# Patient Record
Sex: MALE | NOT HISPANIC OR LATINO | Employment: FULL TIME | URBAN - METROPOLITAN AREA
[De-identification: names, ages, dates, MRNs, and addresses within clinical notes are randomized per-mention and may not be internally consistent; named-entity substitution may affect disease eponyms.]

---

## 2021-05-07 ENCOUNTER — HOSPITAL ENCOUNTER (INPATIENT)
Facility: CLINIC | Age: 36
LOS: 4 days | Discharge: HOME | DRG: 177 | End: 2021-05-11
Attending: EMERGENCY MEDICINE | Admitting: INTERNAL MEDICINE
Payer: COMMERCIAL

## 2021-05-07 ENCOUNTER — APPOINTMENT (OUTPATIENT)
Dept: GENERAL RADIOLOGY | Facility: CLINIC | Age: 36
DRG: 177 | End: 2021-05-07
Attending: EMERGENCY MEDICINE
Payer: COMMERCIAL

## 2021-05-07 DIAGNOSIS — U07.1 2019 NOVEL CORONAVIRUS DISEASE (COVID-19): ICD-10-CM

## 2021-05-07 DIAGNOSIS — J96.01 ACUTE RESPIRATORY FAILURE WITH HYPOXIA (H): ICD-10-CM

## 2021-05-07 DIAGNOSIS — I10 ESSENTIAL HYPERTENSION: Primary | ICD-10-CM

## 2021-05-07 LAB
ABO + RH BLD: NORMAL
ABO + RH BLD: NORMAL
ALBUMIN SERPL-MCNC: 3.3 G/DL (ref 3.4–5)
ALP SERPL-CCNC: 50 U/L (ref 40–150)
ALT SERPL W P-5'-P-CCNC: 34 U/L (ref 0–70)
ANION GAP SERPL CALCULATED.3IONS-SCNC: 6 MMOL/L (ref 3–14)
AST SERPL W P-5'-P-CCNC: 31 U/L (ref 0–45)
BASOPHILS # BLD AUTO: 0 10E9/L (ref 0–0.2)
BASOPHILS NFR BLD AUTO: 0.1 %
BILIRUB SERPL-MCNC: 0.5 MG/DL (ref 0.2–1.3)
BUN SERPL-MCNC: 11 MG/DL (ref 7–30)
CALCIUM SERPL-MCNC: 8.4 MG/DL (ref 8.5–10.1)
CHLORIDE SERPL-SCNC: 103 MMOL/L (ref 94–109)
CO2 SERPL-SCNC: 25 MMOL/L (ref 20–32)
CREAT SERPL-MCNC: 1.12 MG/DL (ref 0.66–1.25)
CRP SERPL-MCNC: 154 MG/L (ref 0–8)
D DIMER PPP FEU-MCNC: 0.6 UG/ML FEU (ref 0–0.5)
DIFFERENTIAL METHOD BLD: NORMAL
EOSINOPHIL # BLD AUTO: 0 10E9/L (ref 0–0.7)
EOSINOPHIL NFR BLD AUTO: 0 %
ERYTHROCYTE [DISTWIDTH] IN BLOOD BY AUTOMATED COUNT: 13.2 % (ref 10–15)
GFR SERPL CREATININE-BSD FRML MDRD: 85 ML/MIN/{1.73_M2}
GLUCOSE SERPL-MCNC: 83 MG/DL (ref 70–99)
HCT VFR BLD AUTO: 46.9 % (ref 40–53)
HGB BLD-MCNC: 15.1 G/DL (ref 13.3–17.7)
IMM GRANULOCYTES # BLD: 0 10E9/L (ref 0–0.4)
IMM GRANULOCYTES NFR BLD: 0.4 %
LDH SERPL L TO P-CCNC: 403 U/L (ref 85–227)
LYMPHOCYTES # BLD AUTO: 1.3 10E9/L (ref 0.8–5.3)
LYMPHOCYTES NFR BLD AUTO: 15.4 %
MCH RBC QN AUTO: 28.7 PG (ref 26.5–33)
MCHC RBC AUTO-ENTMCNC: 32.2 G/DL (ref 31.5–36.5)
MCV RBC AUTO: 89 FL (ref 78–100)
MONOCYTES # BLD AUTO: 0.3 10E9/L (ref 0–1.3)
MONOCYTES NFR BLD AUTO: 3.1 %
NEUTROPHILS # BLD AUTO: 6.8 10E9/L (ref 1.6–8.3)
NEUTROPHILS NFR BLD AUTO: 81 %
NRBC # BLD AUTO: 0 10*3/UL
NRBC BLD AUTO-RTO: 0 /100
PLATELET # BLD AUTO: 175 10E9/L (ref 150–450)
POTASSIUM SERPL-SCNC: 3.7 MMOL/L (ref 3.4–5.3)
PROT SERPL-MCNC: 8.2 G/DL (ref 6.8–8.8)
RBC # BLD AUTO: 5.27 10E12/L (ref 4.4–5.9)
RETICS # AUTO: 16.5 10E9/L (ref 25–95)
RETICS/RBC NFR AUTO: 0.3 % (ref 0.5–2)
SODIUM SERPL-SCNC: 134 MMOL/L (ref 133–144)
SPECIMEN EXP DATE BLD: NORMAL
TROPONIN I SERPL-MCNC: <0.015 UG/L (ref 0–0.04)
WBC # BLD AUTO: 8.4 10E9/L (ref 4–11)

## 2021-05-07 PROCEDURE — 250N000013 HC RX MED GY IP 250 OP 250 PS 637: Performed by: INTERNAL MEDICINE

## 2021-05-07 PROCEDURE — 99223 1ST HOSP IP/OBS HIGH 75: CPT | Mod: AI | Performed by: INTERNAL MEDICINE

## 2021-05-07 PROCEDURE — 93005 ELECTROCARDIOGRAM TRACING: CPT

## 2021-05-07 PROCEDURE — 86901 BLOOD TYPING SEROLOGIC RH(D): CPT | Performed by: PHYSICIAN ASSISTANT

## 2021-05-07 PROCEDURE — 258N000003 HC RX IP 258 OP 636: Performed by: PHYSICIAN ASSISTANT

## 2021-05-07 PROCEDURE — 84484 ASSAY OF TROPONIN QUANT: CPT | Performed by: EMERGENCY MEDICINE

## 2021-05-07 PROCEDURE — 85379 FIBRIN DEGRADATION QUANT: CPT | Performed by: EMERGENCY MEDICINE

## 2021-05-07 PROCEDURE — 71045 X-RAY EXAM CHEST 1 VIEW: CPT

## 2021-05-07 PROCEDURE — 99285 EMERGENCY DEPT VISIT HI MDM: CPT | Mod: 25

## 2021-05-07 PROCEDURE — 250N000009 HC RX 250: Performed by: PHYSICIAN ASSISTANT

## 2021-05-07 PROCEDURE — 85025 COMPLETE CBC W/AUTO DIFF WBC: CPT | Performed by: EMERGENCY MEDICINE

## 2021-05-07 PROCEDURE — 80053 COMPREHEN METABOLIC PANEL: CPT | Performed by: EMERGENCY MEDICINE

## 2021-05-07 PROCEDURE — 36415 COLL VENOUS BLD VENIPUNCTURE: CPT | Performed by: PHYSICIAN ASSISTANT

## 2021-05-07 PROCEDURE — 83615 LACTATE (LD) (LDH) ENZYME: CPT | Performed by: PHYSICIAN ASSISTANT

## 2021-05-07 PROCEDURE — 250N000013 HC RX MED GY IP 250 OP 250 PS 637: Performed by: EMERGENCY MEDICINE

## 2021-05-07 PROCEDURE — 86140 C-REACTIVE PROTEIN: CPT | Performed by: PHYSICIAN ASSISTANT

## 2021-05-07 PROCEDURE — 250N000011 HC RX IP 250 OP 636: Performed by: PHYSICIAN ASSISTANT

## 2021-05-07 PROCEDURE — 120N000001 HC R&B MED SURG/OB

## 2021-05-07 PROCEDURE — 99207 PR NO BILLABLE SERVICE THIS VISIT: CPT | Performed by: PHYSICIAN ASSISTANT

## 2021-05-07 PROCEDURE — 96374 THER/PROPH/DIAG INJ IV PUSH: CPT

## 2021-05-07 PROCEDURE — 86900 BLOOD TYPING SEROLOGIC ABO: CPT | Performed by: PHYSICIAN ASSISTANT

## 2021-05-07 PROCEDURE — 85045 AUTOMATED RETICULOCYTE COUNT: CPT | Performed by: PHYSICIAN ASSISTANT

## 2021-05-07 PROCEDURE — XW033E5 INTRODUCTION OF REMDESIVIR ANTI-INFECTIVE INTO PERIPHERAL VEIN, PERCUTANEOUS APPROACH, NEW TECHNOLOGY GROUP 5: ICD-10-PCS | Performed by: INTERNAL MEDICINE

## 2021-05-07 PROCEDURE — 250N000011 HC RX IP 250 OP 636: Performed by: EMERGENCY MEDICINE

## 2021-05-07 RX ORDER — DEXAMETHASONE SODIUM PHOSPHATE 10 MG/ML
6 INJECTION, SOLUTION INTRAMUSCULAR; INTRAVENOUS ONCE
Status: COMPLETED | OUTPATIENT
Start: 2021-05-07 | End: 2021-05-07

## 2021-05-07 RX ORDER — ACETAMINOPHEN 325 MG/1
650 TABLET ORAL EVERY 4 HOURS PRN
Status: DISCONTINUED | OUTPATIENT
Start: 2021-05-07 | End: 2021-05-11 | Stop reason: HOSPADM

## 2021-05-07 RX ORDER — FAMOTIDINE 10 MG
10 TABLET ORAL 2 TIMES DAILY
Status: DISCONTINUED | OUTPATIENT
Start: 2021-05-07 | End: 2021-05-11 | Stop reason: HOSPADM

## 2021-05-07 RX ORDER — HYDRALAZINE HYDROCHLORIDE 10 MG/1
10 TABLET, FILM COATED ORAL EVERY 8 HOURS PRN
Status: DISCONTINUED | OUTPATIENT
Start: 2021-05-07 | End: 2021-05-11 | Stop reason: HOSPADM

## 2021-05-07 RX ORDER — BENZONATATE 100 MG/1
100 CAPSULE ORAL 3 TIMES DAILY PRN
Status: DISCONTINUED | OUTPATIENT
Start: 2021-05-07 | End: 2021-05-11 | Stop reason: HOSPADM

## 2021-05-07 RX ORDER — ACETAMINOPHEN 500 MG
1000 TABLET ORAL EVERY 8 HOURS PRN
COMMUNITY

## 2021-05-07 RX ORDER — LIDOCAINE 40 MG/G
CREAM TOPICAL
Status: DISCONTINUED | OUTPATIENT
Start: 2021-05-07 | End: 2021-05-11 | Stop reason: HOSPADM

## 2021-05-07 RX ORDER — ACETAMINOPHEN 500 MG
1000 TABLET ORAL ONCE
Status: COMPLETED | OUTPATIENT
Start: 2021-05-07 | End: 2021-05-07

## 2021-05-07 RX ORDER — ALBUTEROL SULFATE 90 UG/1
2 AEROSOL, METERED RESPIRATORY (INHALATION) EVERY 4 HOURS PRN
Status: DISCONTINUED | OUTPATIENT
Start: 2021-05-07 | End: 2021-05-11 | Stop reason: HOSPADM

## 2021-05-07 RX ORDER — ACETAMINOPHEN 650 MG/1
650 SUPPOSITORY RECTAL EVERY 4 HOURS PRN
Status: DISCONTINUED | OUTPATIENT
Start: 2021-05-07 | End: 2021-05-11 | Stop reason: HOSPADM

## 2021-05-07 RX ADMIN — ENOXAPARIN SODIUM 40 MG: 40 INJECTION SUBCUTANEOUS at 20:39

## 2021-05-07 RX ADMIN — Medication 250 MG: at 20:39

## 2021-05-07 RX ADMIN — DEXAMETHASONE SODIUM PHOSPHATE 6 MG: 10 INJECTION, SOLUTION INTRAMUSCULAR; INTRAVENOUS at 17:05

## 2021-05-07 RX ADMIN — SODIUM CHLORIDE 50 ML: 9 INJECTION, SOLUTION INTRAVENOUS at 18:51

## 2021-05-07 RX ADMIN — REMDESIVIR 200 MG: 100 INJECTION, POWDER, LYOPHILIZED, FOR SOLUTION INTRAVENOUS at 18:51

## 2021-05-07 RX ADMIN — FAMOTIDINE 10 MG: 10 TABLET, FILM COATED ORAL at 20:39

## 2021-05-07 RX ADMIN — ACETAMINOPHEN 1000 MG: 500 TABLET, FILM COATED ORAL at 15:43

## 2021-05-07 ASSESSMENT — ENCOUNTER SYMPTOMS
SHORTNESS OF BREATH: 1
FEVER: 1

## 2021-05-07 ASSESSMENT — ACTIVITIES OF DAILY LIVING (ADL): ADLS_ACUITY_SCORE: 16

## 2021-05-07 NOTE — H&P
[facility] Hospitalist Admission Note  Name: Shay Sanabria    MRN: [id number]  YOB: 1985    Age: 35 year old  Date of admission: 5/7/2021  Primary care provider: No Ref-Primary, Physician      Date of Admission:  5/7/2021    Assessment & Plan   Shay Sanabria is a 35 year old male with PMhx of obesity, who presented with dyspnea in the setting of known COVID 19 viral infection after being referred to [facility] ED from outside urgent care.     On presentation temperature 102.5F, /97, pulse 121, RR 29, Oxygen saturations 96% on 2 LPM supplemental NC. 88% with activity on room air.   Laboratory studies including CBC and CMP, remarkable for a creatinine of 1.12.  D-dimer 0.6.  Troponin undetectable.    Discussed with Dr. Siegel in the ED. Patient received 100 mg APAP, Decadron 6 mg IV. Admission requested from hospitalist service for further cares and monitoring.  Per ED provider patient refused transfer to [facility] for consolidated care in the setting of known COVID 19 infection.     1. Acute Hypoxemic Respiratory Failure   COVID 19 Viral  Pneumonia   Diagnosed initially 4/28/21, symptoms of fatigue developed 5/4/2021. Now with dyspnea, fever.  Chest imaging with initial- X-ray-  bilateral airspace opacities in the mid and lower lungs. No additional imaging done. Oxygen needs have been stable on nasal cannula at 1-2 L/min. Desaturated to 88% with ambulation in ED.    PLAN:  - continue care on medical floor with continuous pulse oximetry under contact and droplet precautions, minimized lab draws, and care consolidation  - Oxygen support with nasal cannula at as needed, titrate to keep SPO2 > 90% and no higher than 96%  - d dimer 0.6, reasonable to hold of on further chest imaging such as CT PE study. If ascending o2 requirements or clinically decompensating recommend pursuing. For continue DVT ppx with Lovenox.   - Avoid nebulizers in favor of MDIs; no inhalers needed.  - Decadron daily (day  1 on 5/7)  - monitor glucose with AM lab work. May need sliding scale coverage  - GI ppx  - Consider Remdesivir EUA if elgible (day 1 on 5/7)  - IV fluids are not indicated at this time, hold off if intake/output/ BP remain appropriate   - Antibiotics are are not indicated.   - analgesics prn     2. Atypical Chest Pain  Had self limited, non exertional episode of CP on 5/6. Self resolved within <1 minute.  No associated dyspnea, diaphoresis. No angina on admission.   EKG non ischemic.  Troponin undetectable.  Do not suspect that this is related to cardiac ischemic chest pain given history reassuring work-up and vitals thus far.  Would not trend further troponins at this time in the absence of recurrent symptoms as this is greater than 24 hours since this episode.  He could consider an outpatient stress evaluation.    -Monitor on cardiac telemetry.  -Notify provider for redevelopment of chest pain    3. Hypertension   Elevated BP's on presentation 170-180's systolic.   Pt reports home systolic pressure readings around 140. He has been monitoring given history of elevated blood pressures. Not currently on oral anti - hypertensive's as per report improving with intentional weight loss.  -monitor BP  -may start prn oral hydralazine here in hospital  -recommend he follow up with PCP regarding this    DVT Prophylaxis: lovenox  Code Status: Full Code  Expected discharge: 2 - 3 days, recommended to prior living arrangement .    Daria Perez PA-C    Primary Care Physician   Physician No Ref-Primary    Chief Complaint   Shortness of breath, chest pain    History is obtained from the patient      History of Present Illness   Shay Sanabria is a 35 year old male with PMHx of obesity, who presented with shortness of breath.   Mr. Sanabria was initially diagnosed with Covid on April 28, 2021 after he was screening tested when wife became ill and all family tested positive.  He subsequently developed symptoms around 5/3/2021  that started with fatigue, had self limited episode of loose stool.     On 5/7/2021 he presented to urgent care after complaining of difficulty breathing to nurse triage line.  He was transferred to [facility] ED from outside urgent care for further evaluation.   Dyspnea was described as shortness of breath with activity. He has had associated fatigue, palpitations.  He had a self-limited episode of chest pain lasting under 1 minute that was described as a dull discomfort.  This came on while he was resting. It had no radiation.   He denied any lower extremity swelling or pain.  Had been using tylenol to treat symptoms at home.     On interview patient reports that his dyspnea has improved with supplemental oxygen.  He denies any presyncopal or lightheadedness symptoms with ambulation.  No cough or fever at home. He has not seen a primary care doctor for about 5 years.  He denies any cardiac disease.      Past Medical History    Elevated blood pressure  Obesity    Past Surgical History   None.    Prior to Admission Medications   None     Allergies   No Known Allergies    Social History   Mr Sanabria lives with his family. He works full time. Denies tobacco or alcohol use.    Family History   Reviewed.  Mother with diabetes.    Review of Systems   The 10 point Review of Systems is negative other than noted in the HPI or here.     Physical Exam   Temp: 102.5  F (39.2  C) Temp src: Oral BP: (!) 170/90 Pulse: 113   Resp: 22 SpO2: 97 % O2 Device: Nasal cannula Oxygen Delivery: 2 LPM  Vital Signs with Ranges  Temp:  [102.5  F (39.2  C)] 102.5  F (39.2  C)  Pulse:  [110-121] 113  Resp:  [20-29] 22  BP: (157-177)/(90-97) 170/90  SpO2:  [88 %-100 %] 97 %  0 lbs 0 oz    Constitutional: Awake, alert,  Diaphoretic.   Eyes: Conjunctiva and pupils examined and normal.  HEENT: Moist mucous membranes, normal dentition.  Respiratory: Tachypnea.  Diminished lung sounds throughout.  No wheezing.  Slight inspiratory crackles at right lung  base.  Cardiovascular: Tachycardic rate and rhythm, normal S1 and S2, and no murmur noted.  GI: Soft, non-distended, non-tender, bowel sounds present. No rebound tenderness or guarding.  Lymph/Hematologic: No anterior cervical or supraclavicular adenopathy.  Skin: No rashes, no cyanosis, no edema.  Musculoskeletal: No deformities noted.  No erythema or tenderness. Moving all extremities.  Neurologic: No focal deficits noted. Speech is clear. Coordination and strength grossly normal.   Psychiatric: Appropriate affect.    Data   Data reviewed today:      EKG: Sinus tachycardia.  Imaging:   Recent Results (from the past 24 hour(s))   XR Chest Port 1 View    Narrative    EXAM: XR CHEST PORT 1 VIEW  LOCATION: [facility]  DATE/TIME: 5/7/2021 4:51 PM    INDICATION: Dyspnea  COMPARISON: None.      Impression    IMPRESSION:     There are patchy asymmetric airspace opacities in the mid and lower lungs consistent with an acute airspace process, potentially pneumonia. No pleural fluid.    Cardiac and mediastinal borders are normal.    No acute bone findings.       Recent Labs   Lab 05/07/21  1544   WBC 8.4   HGB 15.1   MCV 89         POTASSIUM 3.7   CHLORIDE 103   CO2 25   BUN 11   CR 1.12   ANIONGAP 6   KATHI 8.4*   GLC 83   ALBUMIN 3.3*   PROTTOTAL 8.2   BILITOTAL 0.5   ALKPHOS 50   ALT 34   AST 31   TROPI <0.015       Daria Perez PA-C on 5/7/2021 at 5:12 PM

## 2021-05-07 NOTE — ED TRIAGE NOTES
Pt presents to ED via EMS from [facility]. Pt seen there for SOB and chest pain. He was diagnosed with COVID on 4/28, and these symptoms began at 0800. Pt placed on 2L O2 due to his O2 saturations being 93%, and EMS reports O2 saturations 98% on O2. Pt states chest pain relieved after oxygen administration. They report normal EKG. Pt febrile on arrival. A&OX4.   
10-Jul-2020

## 2021-05-07 NOTE — PHARMACY-ADMISSION MEDICATION HISTORY
Admission medication history interview status for this patient is complete. See [facility] admission navigator for allergy information, prior to admission medications and immunization status.     Medication history interview done, indicate source(s): Patient  Medication history resources (including written lists, pill bottles, clinic record): Care Everywhere  Pharmacy: [facility] for discharge    Changes made to PTA medication list:  Added: acetaminophen  Deleted: -  Changed: -    Actions taken by pharmacist (provider contacted, etc): Called patient to verify home med list, due to COVID+ test.     Additional medication history information:None    Medication reconciliation/reorder completed by provider prior to medication history?  N   (Y/N)       Prior to Admission medications    Medication Sig Last Dose Taking? Auth Provider   acetaminophen (TYLENOL) 500 MG tablet Take 1,000 mg by mouth every 8 hours as needed for mild pain 5/7/2021 at 0900 Yes Unknown, Entered By History

## 2021-05-07 NOTE — ED NOTES
[facility]  ED Nurse Handoff Report    Shay Sanabria is a 35 year old male   ED Chief complaint: Covid Concern  . ED Diagnosis:   Final diagnoses:   Acute respiratory failure with hypoxia (H)   2019 novel coronavirus disease (COVID-19)     Allergies: No Known Allergies    Code Status: Full Code  Activity level - Baseline/Home:  Independent. Activity Level - Current:   Assist X 1. Lift room needed: No. Bariatric: No   Needed: No   Isolation: Yes. Infection: COVID positive    Vital Signs:   Vitals:    05/07/21 1655 05/07/21 1705 05/07/21 1710 05/07/21 1715   BP:  (!) 170/90     Pulse: 121 118 113 116   Resp:   22    Temp:       TempSrc:       SpO2: 96% 97% 97% 96%       Cardiac Rhythm:  ,      Pain level:    Patient confused: No. Patient Falls Risk: Yes.   Elimination Status: Due to void.   Patient Report - Initial Complaint: SOB & chest pain; COVID positive. Focused Assessment: 35 year old male who presents with a Covid concern. The patient was diagnosed with Covid 9 days ago and seen earlier today at [facility] for shortness of breath and chest pain which both began around 0800 this morning. A normal EKG resulted and he was placed on 2L of oxygen due to his oxygen saturation being around 93%. En route EMS states the patient's oxygen saturation was 98% while on oxygen. The patient reports chest pain has subsided since oxygen administration and is currently febrile.      Review of Systems   Constitutional: Positive for fever.   Respiratory: Positive for shortness of breath.    Cardiovascular: Positive for chest pain (subsided).   All other systems reviewed and are negative.    Pt failed road test. Oxygen saturations dropped to 84-88% with ambulation.   Tests Performed: Labs, EKG, xray. Abnormal Results:   Labs Ordered and Resulted from Time of ED Arrival Up to the Time of Departure from the ED   COMPREHENSIVE METABOLIC PANEL - Abnormal; Notable for the following components:        Result Value    Calcium 8.4 (*)     Albumin 3.3 (*)     All other components within normal limits   D DIMER QUANTITATIVE - Abnormal; Notable for the following components:    D Dimer 0.6 (*)     All other components within normal limits   CBC WITH PLATELETS DIFFERENTIAL   TROPONIN I   PERIPHERAL IV CATHETER   PATIENT CARE ORDER     XR Chest Port 1 View   Final Result   IMPRESSION:       There are patchy asymmetric airspace opacities in the mid and lower lungs consistent with an acute airspace process, potentially pneumonia. No pleural fluid.      Cardiac and mediastinal borders are normal.      No acute bone findings.        .   Treatments provided: Tylenol & IV Decadron  Family Comments: No family here.   OBS brochure/video discussed/provided to patient:  N/A  ED Medications:   Medications   acetaminophen (TYLENOL) tablet 1,000 mg (1,000 mg Oral Given 5/7/21 2173)   dexamethasone PF (DECADRON) injection 6 mg (6 mg Intravenous Given 5/7/21 1705)     Drips infusing:  No  For the majority of the shift, the patient's behavior Green. Interventions performed were N/A.    Sepsis treatment initiated: No     Patient tested for COVID 19 prior to admission: NO - pt already positive    ED Nurse Name/Phone Number: Rose Marie Busch RN,   5:26 PM  RECEIVING UNIT ED HANDOFF REVIEW    Above ED Nurse Handoff Report was reviewed: Yes  Reviewed by: Beverly Oliveros RN on May 7, 2021 at 5:45 PM        No

## 2021-05-07 NOTE — ED PROVIDER NOTES
History   Chief Complaint:  Shortness of Breath and Chest Pain     HPI   Shay Sanabria is a 35 year old male who presents with dyspnea. The patient was diagnosed with Covid 9 days ago and seen earlier today at [facility] for shortness of breath and chest pain which both began around 0800 this morning. A normal EKG resulted and he was placed on 2L of oxygen due to his oxygen saturation being around 93%. En route EMS states the patient's oxygen saturation was 98% while on oxygen. The patient reports his chest pain has subsided since oxygen administration and is currently febrile.     Review of Systems   Constitutional: Positive for fever.   Respiratory: Positive for shortness of breath.    Cardiovascular: Positive for chest pain (subsided).   All other systems reviewed and are negative.      Allergies:  The patient is negative for alcohol use.    Medications:  The patient is not currently taking any prescribed medications.    Past Medical History:    No past medical history on file.    Past Surgical History:    No past surgical history on file.    Social History:  Presents via EMS from [facility].    Physical Exam     Patient Vitals for the past 24 hrs:   BP Temp Temp src Pulse Resp SpO2   05/07/21 1807 (!) 177/85 99.2  F (37.3  C) Oral 114 22 96 %   05/07/21 1745 -- -- -- 114 -- 98 %   05/07/21 1730 (!) 160/87 -- -- 116 -- 97 %   05/07/21 1715 -- -- -- 116 -- 96 %   05/07/21 1710 -- -- -- 113 22 97 %   05/07/21 1705 (!) 170/90 -- -- 118 -- 97 %   05/07/21 1655 -- -- -- 121 -- 96 %   05/07/21 1650 -- -- -- 112 -- 96 %   05/07/21 1645 (!) 177/97 -- -- 112 -- 97 %   05/07/21 1640 (!) 177/97 -- -- 120 29 (!) 88 %   05/07/21 1630 -- -- -- 113 -- 93 %   05/07/21 1615 -- -- -- 112 24 93 %   05/07/21 1600 -- -- -- 112 -- 94 %   05/07/21 1545 -- -- -- 112 -- 95 %   05/07/21 1530 (!) 157/90 -- -- 110 -- 100 %   05/07/21 1522 -- 102.5  F (39.2  C) Oral -- -- --   05/07/21 1515 (!) 169/96 -- -- 114 20  100 %       Physical Exam  Nursing note and vitals reviewed.  HENT:   Mouth/Throat: Moist mucous membranes.   Eyes: EOMI, nonicteric sclera  Cardiovascular: Normal rate, regular rhythm, no murmurs, rubs, or gallops  Pulmonary/Chest: Effort normal and breath sounds normal. No respiratory distress. No wheezes. No rales.   Abdominal: Soft. Nontender, nondistended, no guarding or rigidity.   Musculoskeletal: Normal range of motion.   Neurological: Alert. Moves all extremities spontaneously.   Skin: Skin is warm and dry. No rash noted.   Psychiatric: Normal mood and affect.     Emergency Department Course   ECG (17:30:22):  Indication: Screening for cardiovascular disease.   Rate 112 bpm. WY interval 148. QRS duration 98. QT/QTc 332/453. P-R-T axes 46 -27 38.   Interpretation: Sinus tachycardia.  Agree with computer interpretation.    Interpreted at 1733 by Dr. Siegel.     Imaging:  XR Chest Port 1 view   IMPRESSION:   There are patchy asymmetric airspace opacities in the mid and lower lungs consistent with an acute airspace process, potentially pneumonia. No pleural fluid.   Cardiac and mediastinal borders are normal.   No acute bone findings.  Reading per radiology    Imaging independently reviewed and agree with radiologist interpretation.     Laboratory:  CBC: WBC 8.4, HGB 15.1,    CMP: Glucose 83, Calcium 8.4 (L), Albumin 3.3 (L), o/w WNL (Creatinine 1.12)     D Dimer: 0.6 (H)  Troponin: (Collected 1544) <0.015    Emergency Department Course:    Reviewed:  I reviewed nursing notes, vitals, care everywhere, and past medical history    Assessments:  1525 I obtained history and examined the patient as noted above.   1625 I rechecked the patient.   1718 Patient rechecked and updated. Set for admission.    Consults:   1710 I spoke with Daria Perez PA-C for Dr. Trevor Degroot of the hospitalist service from [place] regarding patient's presentation, findings, and plan of care.    Interventions:  1543 Tylenol 1000  mg Oral  1705 Decadron 6 mg IV    Disposition:  The patient was admitted to the hospital under the care of Dr. Trevor Degroot.     Impression & Plan   Medical Decision Making:  Patient presents with dyspnea in the context of known COVID-19.  He arrives somewhat tachypneic, and on oxygen.  We stopped his oxygen, and his saturations went down to about 91 to 93%.  With ambulation, oxygen saturation dropped to 84%, and he became more dyspneic with a respiratory rate greater than 30.  Oxygen was initiated and patient was started on Decadron.  Chest x-ray consistent with known diagnosis.  D-dimer elevated which is expected.  Hypoxia is consistent with chest x-ray appearance.  Patient's earlier chest pain improved with oxygen.  He has none here.  Troponin and EKG are also negative.  Admission indicated for hypoxia.  Discussed with JONATHAN Perez, who accepts pt for admission.  All of patient's questions answered and he is in agreement with the plan.    Covid-19  Shay Sanabria was evaluated during a global COVID-19 pandemic, which necessitated consideration that the patient might be at risk for infection with the SARS-CoV-2 virus that causes COVID-19.   Applicable protocols for evaluation were followed during the patient's care.   COVID-19 was considered as part of the patient's evaluation. The plan for testing is:  a test was obtained at a previous visit and reviewed & considered today.    Diagnosis:    ICD-10-CM    1. Acute respiratory failure with hypoxia (H)  J96.01    2. 2019 novel coronavirus disease (COVID-19)  U07.1      Scribe Disclosure:  Bertha RUSSO, am serving as a scribe at 3:20 PM on 5/7/2021 to document services personally performed by Nathan Siegel MD based on my observations and the provider's statements to me.      Nathan Siegel MD  05/08/21 2556

## 2021-05-08 LAB
ANION GAP SERPL CALCULATED.3IONS-SCNC: 4 MMOL/L (ref 3–14)
BUN SERPL-MCNC: 14 MG/DL (ref 7–30)
CALCIUM SERPL-MCNC: 9 MG/DL (ref 8.5–10.1)
CHLORIDE SERPL-SCNC: 103 MMOL/L (ref 94–109)
CO2 SERPL-SCNC: 29 MMOL/L (ref 20–32)
CREAT SERPL-MCNC: 1.18 MG/DL (ref 0.66–1.25)
ERYTHROCYTE [DISTWIDTH] IN BLOOD BY AUTOMATED COUNT: 13.2 % (ref 10–15)
FIBRINOGEN PPP-MCNC: 663 MG/DL (ref 200–420)
GFR SERPL CREATININE-BSD FRML MDRD: 80 ML/MIN/{1.73_M2}
GLUCOSE BLDC GLUCOMTR-MCNC: 125 MG/DL (ref 70–99)
GLUCOSE SERPL-MCNC: 104 MG/DL (ref 70–99)
HCT VFR BLD AUTO: 46.1 % (ref 40–53)
HGB BLD-MCNC: 14.8 G/DL (ref 13.3–17.7)
MCH RBC QN AUTO: 27.7 PG (ref 26.5–33)
MCHC RBC AUTO-ENTMCNC: 32.1 G/DL (ref 31.5–36.5)
MCV RBC AUTO: 86 FL (ref 78–100)
PLATELET # BLD AUTO: 212 10E9/L (ref 150–450)
POTASSIUM SERPL-SCNC: 4.2 MMOL/L (ref 3.4–5.3)
RBC # BLD AUTO: 5.35 10E12/L (ref 4.4–5.9)
SODIUM SERPL-SCNC: 136 MMOL/L (ref 133–144)
WBC # BLD AUTO: 7.1 10E9/L (ref 4–11)

## 2021-05-08 PROCEDURE — 250N000013 HC RX MED GY IP 250 OP 250 PS 637: Performed by: PHYSICIAN ASSISTANT

## 2021-05-08 PROCEDURE — 250N000013 HC RX MED GY IP 250 OP 250 PS 637: Performed by: INTERNAL MEDICINE

## 2021-05-08 PROCEDURE — 250N000009 HC RX 250: Performed by: PHYSICIAN ASSISTANT

## 2021-05-08 PROCEDURE — 85027 COMPLETE CBC AUTOMATED: CPT | Performed by: PHYSICIAN ASSISTANT

## 2021-05-08 PROCEDURE — 120N000001 HC R&B MED SURG/OB

## 2021-05-08 PROCEDURE — 250N000011 HC RX IP 250 OP 636: Performed by: PHYSICIAN ASSISTANT

## 2021-05-08 PROCEDURE — 99233 SBSQ HOSP IP/OBS HIGH 50: CPT | Performed by: HOSPITALIST

## 2021-05-08 PROCEDURE — 36415 COLL VENOUS BLD VENIPUNCTURE: CPT | Performed by: PHYSICIAN ASSISTANT

## 2021-05-08 PROCEDURE — 85384 FIBRINOGEN ACTIVITY: CPT | Performed by: PHYSICIAN ASSISTANT

## 2021-05-08 PROCEDURE — 258N000003 HC RX IP 258 OP 636: Performed by: PHYSICIAN ASSISTANT

## 2021-05-08 PROCEDURE — 80048 BASIC METABOLIC PNL TOTAL CA: CPT | Performed by: PHYSICIAN ASSISTANT

## 2021-05-08 PROCEDURE — 999N001017 HC STATISTIC GLUCOSE BY METER IP

## 2021-05-08 PROCEDURE — 250N000012 HC RX MED GY IP 250 OP 636 PS 637: Performed by: PHYSICIAN ASSISTANT

## 2021-05-08 RX ADMIN — REMDESIVIR 100 MG: 100 INJECTION, POWDER, LYOPHILIZED, FOR SOLUTION INTRAVENOUS at 16:40

## 2021-05-08 RX ADMIN — SODIUM CHLORIDE, PRESERVATIVE FREE 50 ML: 5 INJECTION INTRAVENOUS at 16:40

## 2021-05-08 RX ADMIN — ENOXAPARIN SODIUM 40 MG: 40 INJECTION SUBCUTANEOUS at 21:22

## 2021-05-08 RX ADMIN — FAMOTIDINE 10 MG: 10 TABLET, FILM COATED ORAL at 09:40

## 2021-05-08 RX ADMIN — Medication 250 MG: at 09:40

## 2021-05-08 RX ADMIN — DEXAMETHASONE 6 MG: 2 TABLET ORAL at 12:26

## 2021-05-08 RX ADMIN — ACETAMINOPHEN 650 MG: 325 TABLET, FILM COATED ORAL at 19:00

## 2021-05-08 RX ADMIN — FAMOTIDINE 10 MG: 10 TABLET, FILM COATED ORAL at 21:22

## 2021-05-08 ASSESSMENT — ACTIVITIES OF DAILY LIVING (ADL)
ADLS_ACUITY_SCORE: 16

## 2021-05-08 NOTE — PROGRESS NOTES
[facility]    Hospitalist Progress Note  Name: Shay Sanabria    MRN: [id number]  Provider:  Bib Degroot DO, MPH  Date of Service: 05/08/2021    Summary of Stay: Shay Sanabria is a 35 year old male with PMhx of obesity, who presented with dyspnea in the setting of known COVID 19 viral infection after being referred to [facility] ED from outside urgent care.      On presentation temperature 102.5F, /97, pulse 121, RR 29, Oxygen saturations 96% on 2 LPM supplemental NC. 88% with activity on room air.   Laboratory studies including CBC and CMP, remarkable for a creatinine of 1.12.  D-dimer 0.6.  Troponin undetectable.     Discussed with Dr. Siegel in the ED. Patient received 100 mg APAP, Decadron 6 mg IV. Admission requested from hospitalist service for further cares and monitoring.  Per ED provider patient refused transfer to [facility] for consolidated care in the setting of known COVID 19 infection.     Problem List:   1. Acute hypoxic respiratory failure secondary to COVID-19 pneumonia: Doing well on only 1 L of oxygen by nasal cannula.  Continue dexamethasone and remdesivir.  Continue prophylactic enoxaparin.  Hopefully can wean to room air in the next 24 hours and potentially discharge tomorrow.  He does have an home oximeter.  He would want intubation if he were to deteriorate.  2. Hypertension: Blood pressure elevated on admission but now trending down.  Not chronically on antihypertensives.  Blood pressure reasonable for now, follow-up in the outpatient setting.    DVT Prophylaxis: Enoxaparin (Lovenox) SQ  Code Status: Full Code  Diet: Combination Diet Regular Diet Adult    Benavides Catheter: not present  Disposition: Expected discharge in 1-2 days to home. Goals prior to discharge include wean oxygen.   Incidental Findings: None.  Family updated today: No     Interval History   Assumed care from previous hospitalist. The history was fully reviewed.  The patient reports doing well. No chest  pain or shortness of breath. No nausea, vomiting, diarrhea, constipation. No fevers. No other specific complaints identified.     -Data reviewed today: I personally reviewed all new labs and imaging results over the last 24 hours.     Physical Exam   Temp: 98  F (36.7  C) Temp src: Oral BP: (!) 141/82 Pulse: 93   Resp: 16 SpO2: 95 % O2 Device: Nasal cannula Oxygen Delivery: 2 LPM  Vitals:    05/07/21 1829   Weight: 131.1 kg (289 lb)     Vital Signs with Ranges  Temp:  [98  F (36.7  C)-102.5  F (39.2  C)] 98  F (36.7  C)  Pulse:  [] 93  Resp:  [16-29] 16  BP: (141-177)/(82-97) 141/82  SpO2:  [88 %-100 %] 95 %  I/O last 3 completed shifts:  In: -   Out: 825 [Urine:825]    GENERAL: No apparent distress. Awake, alert, and fully oriented.  HEENT: Normocephalic, atraumatic. Extraocular movements intact.  CARDIOVASCULAR: Regular rate and rhythm without murmurs or rubs. No S3.  PULMONARY: Clear bilaterally.  GASTROINTESTINAL: Soft, non-tender, non-distended. Bowel sounds normoactive.   EXTREMITIES: No cyanosis or clubbing. No edema.  NEUROLOGICAL: CN 2-12 grossly intact, no focal neurological deficits.  DERMATOLOGICAL: No rash, ulcer, bruising, nor jaundice.     Medications       remdesivir  100 mg Intravenous Q24H    And     sodium chloride 0.9%  50 mL Intravenous Q24H     dexamethasone  6 mg Oral Daily     enoxaparin ANTICOAGULANT  40 mg Subcutaneous Q24H     famotidine  10 mg Oral BID     sodium chloride (PF)  3 mL Intracatheter Q8H     vitamin C  250 mg Oral Daily     Data     Laboratory:  Recent Labs   Lab 05/08/21  0822 05/07/21  1544   WBC 7.1 8.4   HGB 14.8 15.1   HCT 46.1 46.9   MCV 86 89    175     Recent Labs   Lab 05/08/21  0822 05/07/21  1544    134   POTASSIUM 4.2 3.7   CHLORIDE 103 103   CO2 29 25   ANIONGAP 4 6   * 83   BUN 14 11   CR 1.18 1.12   GFRESTIMATED 80 85   GFRESTBLACK >90 >90   KATHI 9.0 8.4*     No results for input(s): CULT in the last 168 hours.    Imaging:  Recent  Results (from the past 24 hour(s))   XR Chest Port 1 View    Narrative    EXAM: XR CHEST PORT 1 VIEW  LOCATION: [facility]  DATE/TIME: 5/7/2021 4:51 PM    INDICATION: Dyspnea  COMPARISON: None.      Impression    IMPRESSION:     There are patchy asymmetric airspace opacities in the mid and lower lungs consistent with an acute airspace process, potentially pneumonia. No pleural fluid.    Cardiac and mediastinal borders are normal.    No acute bone findings.         Bib Degroot DO MPH  [facility] Hospitalist  [address] [place] [address]  Pager: [phone]  05/08/2021

## 2021-05-08 NOTE — PLAN OF CARE
Lungs with fine crackles in bases. CARRANZA. Voiding with urinal at bedside without issue. 2L O2. Persistent HTN noted in 170s/systolic. PRN for >180 not given- did not meet criteria. IS instruction completed.     Uneventful night. awake majority of the night watching TV or reading. Remains on supplemental O2.     Treatment Plan: Remdesivir, Lovenox, Decadron, supplemental oxygen

## 2021-05-08 NOTE — PLAN OF CARE
Pertinent assessments: Lungs diminished. CARRANZA. Voiding with urinal at bedside without issue. Weaned oxygen from 2 LPM to RA by 1300, no change in breathing. IS being used independently.     Major Shift Events: None, weaned to RA, washed in the bathroom indepdently    Treatment Plan: Remdesivir, Lovenox,   Decadron, supplemental oxygen as needed    Bedside Nurse: Beverly Oliveros RN

## 2021-05-08 NOTE — PROGRESS NOTES
"Count includes the [facility] RCAT     Date:  5/7/21  Admission Dx:  COVID 19  Pulmonary History  NA  Home Nebulizer/MDI Use:  NA     Acuity Level (RCAT flow sheet):    Tobacco history:     Tobacco Use      Smoking status: Not on file       Aerosol Therapy initiated:  Combivent Q6 prn, Albuterol Q4 prn      Pulmonary Hygiene initiated:  NA      Volume Expansion initiated:  Incentive Spirometer      Current Oxygen Requirements:  2L/min  Current SpO2:  96%    Re-evaluation date:  5/10/21    Patient Education:  Will educate on use and mechanism of action with bronchodilators      See \"RT Assessments\" flow sheet for patient assessment scoring and Acuity Level Details.    Christy Baez, RT         "

## 2021-05-08 NOTE — PLAN OF CARE
End of Shift Summary  For vital signs and complete assessments, please see documentation flowsheets.     Pertinent assessments: SOB with any movement, lungs clear and diminished on 2LPM NC  Major Shift Events Admit from ED    Treatment Plan: Remdesivir, Lovenox,   Decadron, supplemental oxygen    Bedside Nurse: Beverly Oliveros RN

## 2021-05-09 PROCEDURE — 250N000013 HC RX MED GY IP 250 OP 250 PS 637: Performed by: INTERNAL MEDICINE

## 2021-05-09 PROCEDURE — 120N000001 HC R&B MED SURG/OB

## 2021-05-09 PROCEDURE — 250N000012 HC RX MED GY IP 250 OP 636 PS 637: Performed by: PHYSICIAN ASSISTANT

## 2021-05-09 PROCEDURE — 250N000011 HC RX IP 250 OP 636: Performed by: INTERNAL MEDICINE

## 2021-05-09 PROCEDURE — 250N000009 HC RX 250: Performed by: PHYSICIAN ASSISTANT

## 2021-05-09 PROCEDURE — 99233 SBSQ HOSP IP/OBS HIGH 50: CPT | Performed by: INTERNAL MEDICINE

## 2021-05-09 PROCEDURE — 258N000003 HC RX IP 258 OP 636: Performed by: PHYSICIAN ASSISTANT

## 2021-05-09 RX ORDER — AMLODIPINE BESYLATE 5 MG/1
5 TABLET ORAL DAILY
Status: DISCONTINUED | OUTPATIENT
Start: 2021-05-09 | End: 2021-05-11 | Stop reason: HOSPADM

## 2021-05-09 RX ADMIN — SODIUM CHLORIDE, PRESERVATIVE FREE 50 ML: 5 INJECTION INTRAVENOUS at 17:13

## 2021-05-09 RX ADMIN — Medication 250 MG: at 10:06

## 2021-05-09 RX ADMIN — ENOXAPARIN SODIUM 40 MG: 40 INJECTION SUBCUTANEOUS at 18:17

## 2021-05-09 RX ADMIN — DEXAMETHASONE 6 MG: 2 TABLET ORAL at 14:17

## 2021-05-09 RX ADMIN — FAMOTIDINE 10 MG: 10 TABLET, FILM COATED ORAL at 22:04

## 2021-05-09 RX ADMIN — AMLODIPINE BESYLATE 5 MG: 5 TABLET ORAL at 17:14

## 2021-05-09 RX ADMIN — REMDESIVIR 100 MG: 100 INJECTION, POWDER, LYOPHILIZED, FOR SOLUTION INTRAVENOUS at 17:13

## 2021-05-09 RX ADMIN — FAMOTIDINE 10 MG: 10 TABLET, FILM COATED ORAL at 10:06

## 2021-05-09 ASSESSMENT — ACTIVITIES OF DAILY LIVING (ADL)
ADLS_ACUITY_SCORE: 16

## 2021-05-09 NOTE — PROGRESS NOTES
"[facility]  Hospitalist Progress Note  Patient Name: Shay Sanabria    MRN: [id number]  Provider: Manny Lynn MD  05/09/21             Assessment and Plan:      Summary of Stay: Shay Sanabria is a 35 year old male with history of obesity (BMI 41.47) who presented to the [facility] ED on 5/7/21with dyspnea in the setting of known COVID 19 viral infection.  He was referred to [facility] ED from outside urgent care.      On presentation temperature 102.5F, /97, pulse 121, RR 29, Oxygen saturations 96% on 2 LPM supplemental NC. 88% with activity on room air.   Laboratory studies including CBC and CMP, remarkable for a creatinine of 1.12.  D-dimer 0.6.  Troponin undetectable.  Patient was admitted to the hospital and treated for COVID-19 pneumonia with oxygen, Lovenox, dexamethasone, and remdesivir.       Problem List:     1. Acute hypoxic respiratory failure secondary to COVID-19 pneumonia: Date of diagnosis was 4/28/21 after his wife tested positive. Symptoms did not start until 5/4/21.  He presented to urgent care and was referred here on 5/7/21.  He is doing well on 2 lpm of oxygen by nasal cannula.  Continue dexamethasone and remdesivir.  Continue prophylactic enoxaparin (change to q 12 hours with BMO > 40).   2. Hypertension: Blood pressure has been persistently elevated.  Start amlodipine 5 mg po daily.      DVT Prophylaxis: Enoxaparin (Lovenox) subcutaneous 40 mg subcutaneous q 12 hours  Code Status: Full Code  Diet: Combination Diet Regular Diet Adult    Benavides Catheter: not present  Disposition: Home most likely once off of O2 which may be a few days.        Interval History:      No new problems. Still with cough, SOB, and CARRANZA. No ab pain, nausea, vomiting, or diarrhea.                   Physical Exam:      Last Vital Signs:  BP (!) 147/84 (BP Location: Left arm)   Pulse 79   Temp 98.4  F (36.9  C) (Oral)   Resp 20   Ht 1.778 m (5' 10\")   Wt 131.1 kg (289 lb)   SpO2 95%   BMI 41.47 kg/m  "     Intake/Output Summary (Last 24 hours) at 5/9/2021 1423  Last data filed at 5/8/2021 1600  Gross per 24 hour   Intake --   Output 550 ml   Net -550 ml       GENERAL:  Comfortable. Cooperative.  PSYCH: pleasant, oriented, No acute distress.  EYES: PERRLA, Normal conjunctiva.  HEART:  Regular rate and rhythm. No JVD. Pulses normal. No edema.  LUNGS:  Clear to auscultation, normal Respiratory effort.  ABDOMEN:  Soft, no hepatosplenomegaly, normal bowel sounds.  EXTREMETIES: No clubbing, cyanosis or ischemia  SKIN:  Dry to touch, No rash.           Medications:      All current medications were reviewed.         Data:      All new lab and imaging data was reviewed.   Labs:       Lab Results   Component Value Date     05/08/2021     05/07/2021    Lab Results   Component Value Date    CHLORIDE 103 05/08/2021    CHLORIDE 103 05/07/2021    Lab Results   Component Value Date    BUN 14 05/08/2021    BUN 11 05/07/2021      Lab Results   Component Value Date    POTASSIUM 4.2 05/08/2021    POTASSIUM 3.7 05/07/2021    Lab Results   Component Value Date    CO2 29 05/08/2021    CO2 25 05/07/2021    Lab Results   Component Value Date    CR 1.18 05/08/2021    CR 1.12 05/07/2021        Recent Labs   Lab 05/08/21  0822 05/07/21  1544   WBC 7.1 8.4   HGB 14.8 15.1   HCT 46.1 46.9   MCV 86 89    175

## 2021-05-09 NOTE — PLAN OF CARE
Lungs clear with occasional dry cough. Up ind. Voiding well and BM at midnight. O2 1L at HS, otherwise tolerating RA. Uneventful night, Slept well    Treatment Plan: Remdesivir, Lovenox, Decadron, supplemental oxygen prn

## 2021-05-10 LAB
INTERPRETATION ECG - MUSE: NORMAL
MAGNESIUM SERPL-MCNC: 2.3 MG/DL (ref 1.6–2.3)
POTASSIUM SERPL-SCNC: 3.7 MMOL/L (ref 3.4–5.3)

## 2021-05-10 PROCEDURE — 36415 COLL VENOUS BLD VENIPUNCTURE: CPT | Performed by: INTERNAL MEDICINE

## 2021-05-10 PROCEDURE — 250N000013 HC RX MED GY IP 250 OP 250 PS 637: Performed by: INTERNAL MEDICINE

## 2021-05-10 PROCEDURE — 83735 ASSAY OF MAGNESIUM: CPT | Performed by: INTERNAL MEDICINE

## 2021-05-10 PROCEDURE — 250N000012 HC RX MED GY IP 250 OP 636 PS 637: Performed by: PHYSICIAN ASSISTANT

## 2021-05-10 PROCEDURE — 250N000009 HC RX 250: Performed by: PHYSICIAN ASSISTANT

## 2021-05-10 PROCEDURE — 258N000003 HC RX IP 258 OP 636: Performed by: PHYSICIAN ASSISTANT

## 2021-05-10 PROCEDURE — 99232 SBSQ HOSP IP/OBS MODERATE 35: CPT | Performed by: INTERNAL MEDICINE

## 2021-05-10 PROCEDURE — 84132 ASSAY OF SERUM POTASSIUM: CPT | Performed by: INTERNAL MEDICINE

## 2021-05-10 PROCEDURE — 120N000001 HC R&B MED SURG/OB

## 2021-05-10 PROCEDURE — 250N000011 HC RX IP 250 OP 636: Performed by: INTERNAL MEDICINE

## 2021-05-10 RX ADMIN — FAMOTIDINE 10 MG: 10 TABLET, FILM COATED ORAL at 20:09

## 2021-05-10 RX ADMIN — REMDESIVIR 100 MG: 100 INJECTION, POWDER, LYOPHILIZED, FOR SOLUTION INTRAVENOUS at 18:16

## 2021-05-10 RX ADMIN — ENOXAPARIN SODIUM 40 MG: 40 INJECTION SUBCUTANEOUS at 10:27

## 2021-05-10 RX ADMIN — Medication 250 MG: at 10:26

## 2021-05-10 RX ADMIN — FAMOTIDINE 10 MG: 10 TABLET, FILM COATED ORAL at 10:26

## 2021-05-10 RX ADMIN — SODIUM CHLORIDE, PRESERVATIVE FREE 50 ML: 5 INJECTION INTRAVENOUS at 18:20

## 2021-05-10 RX ADMIN — ENOXAPARIN SODIUM 40 MG: 40 INJECTION SUBCUTANEOUS at 20:09

## 2021-05-10 RX ADMIN — AMLODIPINE BESYLATE 5 MG: 5 TABLET ORAL at 10:26

## 2021-05-10 RX ADMIN — DEXAMETHASONE 6 MG: 2 TABLET ORAL at 13:49

## 2021-05-10 ASSESSMENT — ACTIVITIES OF DAILY LIVING (ADL)
ADLS_ACUITY_SCORE: 16

## 2021-05-10 NOTE — PLAN OF CARE
Lungs clear. Up ind. Voiding well. RA during evening, 2L while sleeping. Lungs dim. Denies pain. Good appetite.      Major Shift Events: 13 beats of Vtach overnight. MD notified, AM labs added. Patient was self-proned at the time and asymptomatic.     Treatment Plan: Remdesivir, Lovenox, Decadron, supplemental oxygen as needed

## 2021-05-10 NOTE — PROVIDER NOTIFICATION
Notified MD of 13 beat run of [facility], patient was  asymptomatic and has recently proned himself.

## 2021-05-10 NOTE — PROGRESS NOTES
[facility]  Hospitalist Progress Note  Patient Name: Shay Sanabria    MRN: [id number]  Provider: Manny Lynn MD  05/10/21             Assessment and Plan:      Summary of Stay: Shay Sanabria is a 35 year old male with history of obesity (BMI 41.47) who presented to the [facility] ED on 5/7/21with dyspnea in the setting of known COVID 19 viral infection.  He was referred to [facility] ED from an outside urgent care.      On presentation vital signs showed: temperature 102.5F, /97, pulse 121, RR 29, Oxygen saturations 96% on 2 LPM supplemental NC and 88% with activity on room air.   Laboratory studies including CBC and CMP, remarkable for a creatinine of 1.12.  D-dimer 0.6.  Troponin was undetectable.  Patient was admitted to the hospital and treated for COVID-19 pneumonia with oxygen, Lovenox, dexamethasone, and remdesivir.       Problem List:      1. Acute hypoxic respiratory failure secondary to COVID-19 pneumonia: Date of diagnosis was 4/28/21 after his wife tested positive. Symptoms did not start until 5/4/21.  He presented to urgent care and was referred here on 5/7/21.  He is doing well- almost off O2 (RA to 1 lpm).  Continue dexamethasone and remdesivir.  Continue prophylactic enoxaparin q 12 hours. Hopefully home tomorrow if off O2.   2. Hypertension:  Blood pressure has been persistently elevated.  Startedon amlodipine 5 mg po daily on 5/9/21.      DVT Prophylaxis: Enoxaparin (Lovenox) subcutaneous 40 mg subcutaneous q 12 hours  Code Status: Full Code  Diet: Combination Diet Regular Diet Adult    Benavides Catheter: not present  Disposition: Hopefully home tomorrow if off O2.        Interval History:      No new problems.  Feeling better, overall. Self-proning.  Still SOB with ambulation.  No chest pain, ab pain, nausea, vomiting, diarrhea, or dysuria.                   Physical Exam:      Last Vital Signs:  BP (!) 151/83 (BP Location: Right arm)   Pulse 82   Temp 98.6  F (37  C) (Oral)   Resp 24   " Ht 1.778 m (5' 10\")   Wt 131.1 kg (289 lb)   SpO2 95%   BMI 41.47 kg/m      Intake/Output Summary (Last 24 hours) at 5/10/2021 0935  Last data filed at 5/10/2021 0819  Gross per 24 hour   Intake --   Output 725 ml   Net -725 ml       GENERAL:  Comfortable. Cooperative.  PSYCH: pleasant, oriented, No acute distress.  EYES: PERRLA, Normal conjunctiva.  HEART:  Regular rate and rhythm. No JVD. Pulses normal. No edema.  LUNGS:  Clear to auscultation, normal Respiratory effort.  ABDOMEN:  Soft, no hepatosplenomegaly, normal bowel sounds.  EXTREMETIES: No clubbing, cyanosis or ischemia  SKIN:  Dry to touch, No rash.           Medications:      All current medications were reviewed.         Data:      All new lab and imaging data was reviewed.   Labs:       Lab Results   Component Value Date     05/08/2021     05/07/2021    Lab Results   Component Value Date    CHLORIDE 103 05/08/2021    CHLORIDE 103 05/07/2021    Lab Results   Component Value Date    BUN 14 05/08/2021    BUN 11 05/07/2021      Lab Results   Component Value Date    POTASSIUM 3.7 05/10/2021    POTASSIUM 4.2 05/08/2021    POTASSIUM 3.7 05/07/2021    Lab Results   Component Value Date    CO2 29 05/08/2021    CO2 25 05/07/2021    Lab Results   Component Value Date    CR 1.18 05/08/2021    CR 1.12 05/07/2021        Recent Labs   Lab 05/08/21  0822 05/07/21  1544   WBC 7.1 8.4   HGB 14.8 15.1   HCT 46.1 46.9   MCV 86 89    175              "

## 2021-05-10 NOTE — PROGRESS NOTES
Cross cover notified of patient with asymptomatic 13 beat run of V. tach.  Reviewed telemetry.  Regular wide-complex tachycardia self terminated.  Here with COVID-19 and respiratory failure.  -Ordered magnesium and potassium labs  -Continue telemetry  -If more frequent episodes change amlodipine started here to metoprolol

## 2021-05-10 NOTE — PLAN OF CARE
Pertinent assessments:Lungs clear. Up ind. Voiding well. Weaned to 1 LPM NC mid day sats maintained. RA from about 3:30 pm until 7 pm     Major Shift Events: Uneventful , weaned O2    Treatment Plan: Remdesivir, Lovenox,   Decadron, supplemental oxygen as needed    Bedside Nurse: Beverly Oliveros RN

## 2021-05-11 VITALS
BODY MASS INDEX: 41.37 KG/M2 | WEIGHT: 289 LBS | TEMPERATURE: 98.4 F | DIASTOLIC BLOOD PRESSURE: 92 MMHG | HEIGHT: 70 IN | SYSTOLIC BLOOD PRESSURE: 161 MMHG | RESPIRATION RATE: 28 BRPM | OXYGEN SATURATION: 94 % | HEART RATE: 98 BPM

## 2021-05-11 PROBLEM — I10 ESSENTIAL HYPERTENSION: Status: ACTIVE | Noted: 2021-05-11

## 2021-05-11 PROCEDURE — 250N000013 HC RX MED GY IP 250 OP 250 PS 637: Performed by: INTERNAL MEDICINE

## 2021-05-11 PROCEDURE — 250N000011 HC RX IP 250 OP 636: Performed by: INTERNAL MEDICINE

## 2021-05-11 PROCEDURE — 99239 HOSP IP/OBS DSCHRG MGMT >30: CPT | Performed by: INTERNAL MEDICINE

## 2021-05-11 RX ORDER — AMLODIPINE BESYLATE 5 MG/1
5 TABLET ORAL DAILY
Qty: 30 TABLET | Refills: 3 | Status: SHIPPED | OUTPATIENT
Start: 2021-05-11

## 2021-05-11 RX ORDER — DEXAMETHASONE 6 MG/1
6 TABLET ORAL DAILY
Qty: 5 TABLET | Refills: 0 | Status: SHIPPED | OUTPATIENT
Start: 2021-05-11 | End: 2021-05-16

## 2021-05-11 RX ADMIN — FAMOTIDINE 10 MG: 10 TABLET, FILM COATED ORAL at 09:57

## 2021-05-11 RX ADMIN — AMLODIPINE BESYLATE 5 MG: 5 TABLET ORAL at 09:57

## 2021-05-11 RX ADMIN — ENOXAPARIN SODIUM 40 MG: 40 INJECTION SUBCUTANEOUS at 09:57

## 2021-05-11 RX ADMIN — Medication 250 MG: at 09:57

## 2021-05-11 ASSESSMENT — ACTIVITIES OF DAILY LIVING (ADL)
ADLS_ACUITY_SCORE: 16

## 2021-05-11 NOTE — DISCHARGE SUMMARY
"Discharge Summary    Shay Sanabria MRN# [id number]   YOB: 1985 Age: 35 year old     Date of Admission:  5/7/2021  Date of Discharge:  5/11/2021  Admitting Physician:  Edgar Degroot DO  Discharge Physician:  Manny Lynn MD  Discharging Service:  Hospitalist     Home clinic: To establish at [facility]          Discharge Diagnosis:   COVID-19 pneumonia  Acute hypoxic respiratory failure  Hypertension             Discharge Disposition:   Discharged to home           Allergies:   No Known Allergies             Condition on Discharge:   Discharge condition: Stable   Discharge vitals: Blood pressure (!) 156/83, pulse 80, temperature 98.4  F (36.9  C), temperature source Oral, resp. rate 28, height 1.778 m (5' 10\"), weight 131.1 kg (289 lb), SpO2 94 %.   Code status on discharge: Full Code   Physical exam on day of discharge:   GENERAL:  Comfortable. Cooperative.  PSYCH: pleasant, oriented, No acute distress.  EYES: PERRLA, Normal conjunctiva.  HEART:  Regular rate and rhythm. No JVD. Pulses normal. No edema.  LUNGS:  Clear to auscultation, normal Respiratory effort.  ABDOMEN:  Soft, no hepatosplenomegaly, normal bowel sounds.  EXTREMETIES: No clubbing, cyanosis or ischemia  SKIN:  Dry to touch, No rash.         History of Present Illness and Hospital Course:     See detailed admission note for full details.  Shay Sanabria is a 35 year old male with history of obesity (BMI 41.47) who presented to the [facility] ED on 5/7/21with dyspnea in the setting of known COVID 19 viral infection.  He was referred to [facility] ED from an outside urgent care.      On presentation vital signs showed: temperature 102.5F, /97, pulse 121, RR 29, Oxygen saturations 96% on 2 LPM supplemental NC and 88% with activity on room air.   Laboratory studies including CBC and CMP, remarkable for a creatinine of 1.12.  D-dimer 0.6.  Troponin was undetectable.  Shay was admitted to the hospital and treated for " COVID-19 pneumonia with oxygen, Lovenox, dexamethasone, and remdesivir.  He improved over the next 3 days.      Problem List:      1. Acute hypoxic respiratory failure secondary to COVID-19 pneumonia: Date of diagnosis was 4/28/21 after his wife tested positive. Symptoms did not start until 5/4/21.  He presented to urgent care and was referred here on 5/7/21.  He was admitted with hypoxia. He was treated with oxygen, lovenox, dexamethasone, and remdesivir.  He has improved, weaning off of supplemental oxygen on 5/10/21. Plan to discharge home today with 5 more days of dexamethasone and 30 days of Xarelto.   2. Hypertension, untreated prior to presenation.  Startedon amlodipine 5 mg po daily on 5/9/21 which will be continued on discharge. Outpatient follow up at [facility] advised.  3. Obesity.  Shay has lost 35 pounds in the last several months. I encouraged him to keep up that hard work.           Procedures / Imaging:   CXR            Consultations:   No consultations were requested during this admission             Pending Results:   None           Discharge Instructions and Follow-Up:   Discharge diet: Regular   Discharge activity: Activity as tolerated   Discharge follow-up: Follow up with primary care provider in 1 week   Outpatient therapy: None    Other instructions: None             Discharge Medications:   Current Discharge Medication List      START taking these medications    Details   amLODIPine (NORVASC) 5 MG tablet Take 1 tablet (5 mg) by mouth daily  Qty: 30 tablet, Refills: 3    Associated Diagnoses: Essential hypertension      dexamethasone (DECADRON) 6 MG tablet Take 1 tablet (6 mg) by mouth daily for 5 days  Qty: 5 tablet, Refills: 0    Associated Diagnoses: Acute respiratory failure with hypoxia (H); 2019 novel coronavirus disease (COVID-19)      rivaroxaban ANTICOAGULANT (XARELTO) 10 MG TABS tablet Take 1 tablet (10 mg) by mouth daily (with dinner)  Qty: 30 tablet, Refills: 0     Comments: May sub elliquis 2.5 mg po bid for 30 days if insurance prefers  Associated Diagnoses: 2019 novel coronavirus disease (COVID-19)         CONTINUE these medications which have NOT CHANGED    Details   acetaminophen (TYLENOL) 500 MG tablet Take 1,000 mg by mouth every 8 hours as needed for mild pain                Total time spent in face to face contact with the patient and coordinating discharge was:  35 Minutes

## 2021-05-11 NOTE — PHARMACY - DISCHARGE MEDICATION RECONCILIATION AND EDUCATION
Discharge medication review for this patient is complete.   Patient was counseled on: Xarelto.  See EPIC for allergy information, prior to admission medications and immunization status.   Pharmacist assisted with medication reconciliation of discharge medications with PTA medications.    Additional medication history information:None.    Discharge Medication List     Review of your medicines      START taking      Dose / Directions   amLODIPine 5 MG tablet  Commonly known as: NORVASC      Dose: 5 mg  Take 1 tablet (5 mg) by mouth daily  Quantity: 30 tablet  Refills: 3     dexamethasone 6 MG tablet  Commonly known as: DECADRON      Dose: 6 mg  Take 1 tablet (6 mg) by mouth daily for 5 days  Quantity: 5 tablet  Refills: 0     rivaroxaban ANTICOAGULANT 10 MG Tabs tablet  Commonly known as: XARELTO      Dose: 10 mg  Take 1 tablet (10 mg) by mouth daily (with dinner)  Quantity: 30 tablet  Refills: 0        CONTINUE these medicines which have NOT CHANGED      Dose / Directions   acetaminophen 500 MG tablet  Commonly known as: TYLENOL      Dose: 1,000 mg  Take 1,000 mg by mouth every 8 hours as needed for mild pain  Refills: 0           Where to get your medicines      These medications were sent to [facility] [place], [place] - [address] [address]  [address] [address] [address]    Phone: [phone]     amLODIPine 5 MG tablet    dexamethasone 6 MG tablet    rivaroxaban ANTICOAGULANT 10 MG Tabs tablet

## 2021-05-11 NOTE — PLAN OF CARE
Vitals VSS, except BP slightly elevated   Neuro A&O  Respiratory 92% RA, dyspnea on exertion. Independent use of IS  Cardiac/Tele SR  GI/ Urinal at bedside   Skin WDL  LDAs PIV SL  Labs K+ 3.7, Mag 2.3  Diet Regular   Activity Independent in room   Plan Possible discharge home today. Continue with POC.

## 2021-05-11 NOTE — PLAN OF CARE
Pt hospitalized for Dyspnea and COVID positive.  Discharge education provided to patient and patient verbalized understanding of medications and orders.  Medications filled at [facility] pharmacy.   Pt verbalized will follow up with primary care provider.  Patient discharging to home and transportation provided by wife. No further questions at this time.

## 2021-05-11 NOTE — PLAN OF CARE
[id number]    Pertinent assessments:  Lungs clear. Up independently. Voiding. Treatment planned followed. Independent use of  IS, no need for supplemental  oxygen today.     Major Shift Events: Uneventful. Independent use of IS. Sitting on edge of bed, ambulating in room independently. Anticipating discharge home tomorrow.    Treatment Plan: Remdesivir, Lovenox, Decadron

## 2021-05-12 ENCOUNTER — PATIENT OUTREACH (OUTPATIENT)
Dept: CARE COORDINATION | Facility: CLINIC | Age: 36
End: 2021-05-12

## 2021-05-12 DIAGNOSIS — U07.1 2019 NOVEL CORONAVIRUS DISEASE (COVID-19): Primary | ICD-10-CM

## 2021-05-12 NOTE — PROGRESS NOTES
Clinic Care Coordination Contact  [facility]/Voicemail       Clinical Data: Care Coordinator Outreach  Outreach attempted x 1.  Left message on patient's voicemail with call back information and requested return call.  Plan:  Care Coordinator will try to reach patient again in 1-2 business days

## 2021-05-13 NOTE — PROGRESS NOTES
Clinic Care Coordination Contact  [facility]/Voicemail       Clinical Data: Care Coordinator Outreach  Outreach attempted x 2.  Left message on patient's voicemail with call back information and requested return call.  Plan: Care Coordinator will do no further outreaches at this time.

## 2021-06-27 ENCOUNTER — HEALTH MAINTENANCE LETTER (OUTPATIENT)
Age: 36
End: 2021-06-27

## 2021-10-17 ENCOUNTER — HEALTH MAINTENANCE LETTER (OUTPATIENT)
Age: 36
End: 2021-10-17

## 2022-07-24 ENCOUNTER — HEALTH MAINTENANCE LETTER (OUTPATIENT)
Age: 37
End: 2022-07-24

## 2022-10-03 ENCOUNTER — HEALTH MAINTENANCE LETTER (OUTPATIENT)
Age: 37
End: 2022-10-03

## 2023-08-12 ENCOUNTER — HEALTH MAINTENANCE LETTER (OUTPATIENT)
Age: 38
End: 2023-08-12